# Patient Record
Sex: MALE | Race: WHITE | NOT HISPANIC OR LATINO | Employment: OTHER | URBAN - METROPOLITAN AREA
[De-identification: names, ages, dates, MRNs, and addresses within clinical notes are randomized per-mention and may not be internally consistent; named-entity substitution may affect disease eponyms.]

---

## 2023-09-08 ENCOUNTER — APPOINTMENT (OUTPATIENT)
Dept: PRIMARY CARE | Facility: CLINIC | Age: 68
End: 2023-09-08
Payer: COMMERCIAL

## 2023-11-20 ENCOUNTER — APPOINTMENT (OUTPATIENT)
Dept: AUDIOLOGY | Facility: CLINIC | Age: 68
End: 2023-11-20
Payer: COMMERCIAL

## 2023-11-20 ENCOUNTER — APPOINTMENT (OUTPATIENT)
Dept: OTOLARYNGOLOGY | Facility: CLINIC | Age: 68
End: 2023-11-20
Payer: COMMERCIAL

## 2023-11-27 ENCOUNTER — CLINICAL SUPPORT (OUTPATIENT)
Dept: AUDIOLOGY | Facility: HOSPITAL | Age: 68
End: 2023-11-27
Payer: COMMERCIAL

## 2023-11-27 DIAGNOSIS — H90.3 SENSORINEURAL HEARING LOSS (SNHL) OF BOTH EARS: Primary | ICD-10-CM

## 2023-11-27 PROCEDURE — 92550 TYMPANOMETRY & REFLEX THRESH: CPT | Performed by: AUDIOLOGIST

## 2023-11-27 PROCEDURE — 92557 COMPREHENSIVE HEARING TEST: CPT | Performed by: AUDIOLOGIST

## 2023-11-27 ASSESSMENT — PAIN - FUNCTIONAL ASSESSMENT: PAIN_FUNCTIONAL_ASSESSMENT: 0-10

## 2023-11-27 ASSESSMENT — ENCOUNTER SYMPTOMS
OCCASIONAL FEELINGS OF UNSTEADINESS: 0
LOSS OF SENSATION IN FEET: 0
DEPRESSION: 0

## 2023-11-27 ASSESSMENT — PAIN SCALES - GENERAL: PAINLEVEL_OUTOF10: 0 - NO PAIN

## 2023-11-27 NOTE — PROGRESS NOTES
AUDIOLOGY ADULT AUDIOMETRIC EVALUATION      Name:  Jim Stapleton  :  1955  Age:  68 y.o.  Date of Evaluation:  2023    History:  Reason for visit:  Mr. Stapleton is seen today for an initial audiologic evaluation due to complaints of gradual decrease in hearing bilaterally. History obtained from patient report and chart review.     Change in Hearing: yes in both ears long-term duration, unsure of onset  Difficult listening environments: Others have reported that he has been talking more loudly, television volume is loud, difficulty understanding certain pitch voices  Tinnitus: yes in the right ear intermittent, non-bothersome, described as high buzzing sensation, that goes away quickly  Otalgia: denied   Aural Pressure/Fullness: yes in the right ear greater than the left ear  occurs occasionally with chewing  Recent Ear Infections/Illness: denied   Otorrhea: denied   Dizziness: denied   History of Ear Surgeries: denied   History of Noise Exposure: Yes, 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   Left Ear: Clear ear canal, TM was visualized with identifiable cone of light.        Immittance Testing (226 Hz):   Right Ear: Type A, normal middle ear pressure and mobility.   Left Ear: Type As, normal middle ear pressure and reduced mobility.    Test technique:  Standard Audiometry via insert earphones    Reliability:   good    Pure Tone Audiometry:  Mild for 125-1000 Hz, sloping to moderate through 4000 Hz and severe through 8000 Hz for both ears.     Speech Audiometry:   Right Ear:  Speech Reception Threshold (SRT) was obtained at 45 dBHL  Word Recognition scores were  good in quiet when words were presented at 85 dBHL  Left Ear:  Speech Reception Threshold (SRT) was obtained at 45 dBHL  Word Recognition scores were good in quiet when words were presented at 85 dBHL    IMPRESSIONS:  Today's test results are hearing loss requiring audiologic follow-up.     Amplification needs:  Jim would benefit from bilateral amplification fitted to his hearing loss.     RECOMMENDATIONS:  Follow-up with Lonnie Clarke as scheduled.  Contact insurance company to inquire about where is in network for hearing aids.    Return annually for audiologic evaluation, or sooner if concerns arise.     PATIENT EDUCATION:   Discussed results and recommendations with Mr. Stapleton. Questions were addressed and the patient was encouraged to contact our department should concerns arise.      Jessica Mejia, [facility]  Licensed Audiologist    TIME: 1135 - 1200        Degree of   Hearing Sensitivity dB Range   Within Normal Limits (WNL) 0 - 20   Slight 25   Mild 26 - 40   Moderate 41 - 55   Moderately-Severe 56 - 70   Severe 71 - 90   Profound 91 +      KEY  TM Tympanic Membrane   WNL Within Normal Limits   HA Hearing Aid   SNHL Sensorineural Hearing Loss   CHL Conductive Hearing Loss   NIHL Noise-Induced Hearing Loss   ECV Ear Canal Volume

## 2023-11-29 ENCOUNTER — APPOINTMENT (OUTPATIENT)
Dept: OTOLARYNGOLOGY | Facility: HOSPITAL | Age: 68
End: 2023-11-29
Payer: COMMERCIAL

## 2024-01-29 ENCOUNTER — OFFICE VISIT (OUTPATIENT)
Dept: OTOLARYNGOLOGY | Facility: CLINIC | Age: 69
End: 2024-01-29
Payer: COMMERCIAL

## 2024-01-29 VITALS — TEMPERATURE: 97.1 F | HEIGHT: 73 IN | WEIGHT: 215.9 LBS | BODY MASS INDEX: 28.61 KG/M2

## 2024-01-29 DIAGNOSIS — H90.3 SENSORINEURAL HEARING LOSS (SNHL) OF BOTH EARS: Primary | ICD-10-CM

## 2024-01-29 PROCEDURE — 99214 OFFICE O/P EST MOD 30 MIN: CPT | Performed by: OTOLARYNGOLOGY

## 2024-01-29 PROCEDURE — 1126F AMNT PAIN NOTED NONE PRSNT: CPT | Performed by: OTOLARYNGOLOGY

## 2024-01-29 PROCEDURE — 1160F RVW MEDS BY RX/DR IN RCRD: CPT | Performed by: OTOLARYNGOLOGY

## 2024-01-29 PROCEDURE — 1159F MED LIST DOCD IN RCRD: CPT | Performed by: OTOLARYNGOLOGY

## 2024-01-29 PROCEDURE — 1036F TOBACCO NON-USER: CPT | Performed by: OTOLARYNGOLOGY

## 2024-01-29 RX ORDER — ALLOPURINOL 300 MG/1
300 TABLET ORAL
COMMUNITY
Start: 2013-10-03

## 2024-01-29 RX ORDER — CLOPIDOGREL BISULFATE 75 MG/1
75 TABLET ORAL
COMMUNITY
Start: 2023-02-17

## 2024-01-29 RX ORDER — ISOSORBIDE MONONITRATE 60 MG/1
60 TABLET, EXTENDED RELEASE ORAL
COMMUNITY
Start: 2022-10-21

## 2024-01-29 RX ORDER — METFORMIN HYDROCHLORIDE 500 MG/1
TABLET ORAL
COMMUNITY
Start: 2020-11-20

## 2024-01-29 RX ORDER — NITROGLYCERIN 0.4 MG/1
0.4 TABLET SUBLINGUAL
COMMUNITY
Start: 2024-01-23

## 2024-01-29 RX ORDER — ROSUVASTATIN CALCIUM 20 MG/1
20 TABLET, COATED ORAL
COMMUNITY
Start: 2023-02-17

## 2024-01-29 RX ORDER — LANCETS 28 GAUGE
EACH MISCELLANEOUS
COMMUNITY
Start: 2023-02-17

## 2024-01-29 RX ORDER — METOPROLOL TARTRATE 25 MG/1
25 TABLET, FILM COATED ORAL 2 TIMES DAILY
COMMUNITY
Start: 2023-02-17

## 2024-01-29 RX ORDER — EVOLOCUMAB 140 MG/ML
INJECTION, SOLUTION SUBCUTANEOUS
COMMUNITY
Start: 2023-12-26

## 2024-01-29 RX ORDER — BLOOD-GLUCOSE METER
KIT MISCELLANEOUS
COMMUNITY
Start: 2016-10-04

## 2024-01-29 RX ORDER — GLIMEPIRIDE 2 MG/1
4 TABLET ORAL
COMMUNITY
Start: 2016-10-04

## 2024-01-29 RX ORDER — FAMOTIDINE 20 MG/1
TABLET, FILM COATED ORAL
COMMUNITY

## 2024-01-29 RX ORDER — FUROSEMIDE 40 MG/1
1 TABLET ORAL DAILY
COMMUNITY
Start: 2022-10-21

## 2024-01-29 RX ORDER — SACUBITRIL AND VALSARTAN 49; 51 MG/1; MG/1
1 TABLET, FILM COATED ORAL 2 TIMES DAILY
COMMUNITY
Start: 2023-11-03

## 2024-01-29 RX ORDER — EMPAGLIFLOZIN 10 MG/1
TABLET, FILM COATED ORAL
COMMUNITY
Start: 2022-08-25

## 2024-01-29 ASSESSMENT — PATIENT HEALTH QUESTIONNAIRE - PHQ9
SUM OF ALL RESPONSES TO PHQ9 QUESTIONS 1 AND 2: 0
2. FEELING DOWN, DEPRESSED OR HOPELESS: NOT AT ALL
1. LITTLE INTEREST OR PLEASURE IN DOING THINGS: NOT AT ALL

## 2024-01-29 NOTE — PROGRESS NOTES
Reason for Consult:  Hearing Loss     Subjective   History Of Present Illness:  Jim Stapleton is a 68 y.o. male who noticed a decline in his hearing over the last few years in both ears. He denies familial hearing loss, chemotherapy, radiation therapy, or long term IV antibiotics.     He saw Christina Sen who did a hearing test that showed a bilateral symmetric sensory hearing loss, mild downsloping to moderately severe. For that reason, he was referred to me for evaluation of treatment. He denies dizziness or tinnitus     Past Medical History:  He has no past medical history on file.    Surgical History:  He has no past surgical history on file.     Social History:  He reports that he has never smoked. He has never used smokeless tobacco. No history on file for alcohol use and drug use.    Family History:  family history is not on file.     Medications:  Current Outpatient Medications   Medication Instructions    allopurinol (ZYLOPRIM) 300 mg, oral, Daily RT    clopidogrel (PLAVIX) 75 mg, oral, Daily RT    Entresto 49-51 mg tablet 1 tablet, oral, 2 times daily    famotidine (Pepcid) 20 mg tablet 1 tablet at bedtime as needed Orally Once a day for 90 days    FreeStyle Lancets 28 gauge     FreeStyle Lite Strips strip     furosemide (Lasix) 40 mg tablet 1 tablet, oral, Daily    glimepiride (AMARYL) 4 mg, oral    isosorbide mononitrate ER (IMDUR) 60 mg, oral, Daily RT    Jardiance 10 mg Daily RT    metFORMIN (Glucophage) 500 mg tablet twice daily with meals.    metoprolol tartrate (LOPRESSOR) 25 mg, oral, 2 times daily    nitroglycerin (NITROSTAT) 0.4 mg, sublingual    Repatha SureClick 140 mg/mL injection ADMINISTER 1 ML UNDER THE SKIN EVERY 2 WEEKS    rosuvastatin (CRESTOR) 20 mg, oral, Daily RT      Allergies:  Adalimumab, Aspirin, Iodinated contrast media, and Indomethacin    Review of Systems:   A comprehensive 10-point review of systems was obtained including constitutional, neurological, HEENT, pulmonary,  "cardiovascular, genito-urinary, and other pertinent systems and was negative except as noted in the HPI.     Objective   Physical Exam:  Last Recorded Vitals: Temperature 36.2 °C (97.1 °F), height 1.854 m (6' 1\"), weight 97.9 kg (215 lb 14.4 oz).    On physical exam, the patient is a well-nourished, well-developed patient, in no acute distress, able to communicate without assistance in English language. Head and face is atraumatic and normocephalic. Salivary glands are intact. Facial strength is symmetrical bilaterally.       On ear examination:  Right ear: The patient has an open and patent ear canal. The tympanic membrane is intact.  AC>BC  Left ear: The patient has an open and patent ear canal. The tympanic membrane is intact.  AC>BC  The Sow is midline    On vestibular exam, the patient has no spontaneous nystagmus, no headshake nystagmus, no head-thrust nystagmus, and no nystagmus on hyperventilation or Valsalva maneuvers. Vacherie-Hallpike maneuver is negative bilaterally.       On neuro exam, the patient is alert and oriented x3, cranial nerves are grossly intact, cerebellar exam is normal.      The rest of the exam, including anterior rhinoscopy, oropharyngeal exam, neck exam, and cardiovascular exam, were normal including no palpable lymphadenopathies, thyroid in the midline position, normal pulses, and normal chest excursion.       Reviewed Results:  Audiology Testing:  I personally reviewed the audiogram from 11/2023 that showed a mild sensory hearing loss downsloping to moderately severe levels bilaterally. It is symmetric. He had 90% discrimination on the right and 88% on the left.         Procedure:  None    Assessment/Plan     In summary, Jim Stapleton is a 68 y.o. male with bilateral symmetric mild downsloping to moderately severe sensory hearing loss with good speech understanding.     I recommended a hearing evaluation. I provided him with a prescription.    I plan to see him back as needed.        Scribe " Attestation  By signing my name below, I, Tarik Owen   attest that this documentation has been prepared under the direction and in the presence of Lonnie Clarke MD.   ____________________________________________________  Lonnie Mayberry MD  Professor and Chief   Otology/Neurotology/Lateral Skull-Base Surgery   [facility]

## 2024-01-29 NOTE — LETTER
January 29, 2024     Tonia Clark MD  [address]  [address]  [place] [address]    Patient: Jim Stapleton   YOB: 1955   Date of Visit: 1/29/2024       Dear Dr. Tonia Clark MD:    Thank you for referring Jim Stapleton to me for evaluation. Below are my notes for this consultation.  If you have questions, please do not hesitate to call me. I look forward to following your patient along with you.       Sincerely,     Lonnie Clarke MD      CC: No Recipients  ______________________________________________________________________________________            Reason for Consult:  Hearing Loss     Subjective  History Of Present Illness:  Jim Stapleton is a 68 y.o. male who noticed a decline in his hearing over the last few years in both ears. He denies familial hearing loss, chemotherapy, radiation therapy, or long term IV antibiotics.     He saw Christina Sen who did a hearing test that showed a bilateral symmetric sensory hearing loss, mild downsloping to moderately severe. For that reason, he was referred to me for evaluation of treatment. He denies dizziness or tinnitus     Past Medical History:  He has no past medical history on file.    Surgical History:  He has no past surgical history on file.     Social History:  He reports that he has never smoked. He has never used smokeless tobacco. No history on file for alcohol use and drug use.    Family History:  family history is not on file.     Medications:  Current Outpatient Medications   Medication Instructions   • allopurinol (ZYLOPRIM) 300 mg, oral, Daily RT   • clopidogrel (PLAVIX) 75 mg, oral, Daily RT   • Entresto 49-51 mg tablet 1 tablet, oral, 2 times daily   • famotidine (Pepcid) 20 mg tablet 1 tablet at bedtime as needed Orally Once a day for 90 days   • FreeStyle Lancets 28 gauge    • FreeStyle Lite Strips strip    • furosemide (Lasix) 40 mg tablet 1 tablet, oral, Daily   • glimepiride (AMARYL) 4 mg, oral   • isosorbide  "mononitrate ER (IMDUR) 60 mg, oral, Daily RT   • Jardiance 10 mg Daily RT   • metFORMIN (Glucophage) 500 mg tablet twice daily with meals.   • metoprolol tartrate (LOPRESSOR) 25 mg, oral, 2 times daily   • nitroglycerin (NITROSTAT) 0.4 mg, sublingual   • Repatha SureClick 140 mg/mL injection ADMINISTER 1 ML UNDER THE SKIN EVERY 2 WEEKS   • rosuvastatin (CRESTOR) 20 mg, oral, Daily RT      Allergies:  Adalimumab, Aspirin, Iodinated contrast media, and Indomethacin    Review of Systems:   A comprehensive 10-point review of systems was obtained including constitutional, neurological, HEENT, pulmonary, cardiovascular, genito-urinary, and other pertinent systems and was negative except as noted in the HPI.     Objective  Physical Exam:  Last Recorded Vitals: Temperature 36.2 °C (97.1 °F), height 1.854 m (6' 1\"), weight 97.9 kg (215 lb 14.4 oz).    On physical exam, the patient is a well-nourished, well-developed patient, in no acute distress, able to communicate without assistance in English language. Head and face is atraumatic and normocephalic. Salivary glands are intact. Facial strength is symmetrical bilaterally.       On ear examination:  Right ear: The patient has an open and patent ear canal. The tympanic membrane is intact.  AC>BC  Left ear: The patient has an open and patent ear canal. The tympanic membrane is intact.  AC>BC  The Sow is midline    On vestibular exam, the patient has no spontaneous nystagmus, no headshake nystagmus, no head-thrust nystagmus, and no nystagmus on hyperventilation or Valsalva maneuvers. Radha-Hallpike maneuver is negative bilaterally.       On neuro exam, the patient is alert and oriented x3, cranial nerves are grossly intact, cerebellar exam is normal.      The rest of the exam, including anterior rhinoscopy, oropharyngeal exam, neck exam, and cardiovascular exam, were normal including no palpable lymphadenopathies, thyroid in the midline position, normal pulses, and normal chest " excursion.       Reviewed Results:  Audiology Testing:  I personally reviewed the audiogram from 11/2023 that showed a mild sensory hearing loss downsloping to moderately severe levels bilaterally. It is symmetric. He had 90% discrimination on the right and 88% on the left.         Procedure:  None    Assessment/Plan    In summary, Jim Stapleton is a 68 y.o. male with bilateral symmetric mild downsloping to moderately severe sensory hearing loss with good speech understanding.     I recommended a hearing evaluation. I provided him with a prescription.    I plan to see him back as needed.        Scribe Attestation  By signing my name below, I, Rosey Case , Scribe   attest that this documentation has been prepared under the direction and in the presence of Lonnie Clarke MD.   ____________________________________________________  Lonnie Mayberry MD  Professor and Chief   Otology/Neurotology/Lateral Skull-Base Surgery   [facility]

## 2024-02-22 ENCOUNTER — APPOINTMENT (OUTPATIENT)
Dept: AUDIOLOGY | Facility: CLINIC | Age: 69
End: 2024-02-22
Payer: COMMERCIAL

## 2024-03-11 NOTE — PROGRESS NOTES
HEARING AID CONSULTATION    Name: Jim Stapleton  Age: 68 y.o.  MRN: [id number]  Date of Appointment:  3/12/2024    History:  Jim Stapleton, 68 y.o. years old, was seen for a hearing aid consultation. He is accompanied by his partner, Phillip,  to today's appointment.     He has a long-standing history of hearing loss in both ears. He has previously indicated difficulties understanding others in person and on the television. He has intermittent tinnitus in the right ear. 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    Previous hearing evaluation on 11/27/2023 revealed a mild through 1000 Hz sloping to severe sensorineural hearing loss in both ears. Word recognition abilities were measured to be excellent in the right ear and good in the left ear.     Hearing Aid Consultation  The patient reported that he has a hearing aid benefit through his Medicaid insurance. He was informed that [facility] is not an in-network hearing aid provider for Medicaid. Any hearing aid purchase would be an out-of-pocket expense for this patient. He stated that he would like to obtain hearing aids through his benefit. He was given the contact information for [facility] and encouraged to contact his  for other in-network providers.    Recommendations  1) Schedule an appointment at an in-network hearing aid provider  2) Re-test hearing annually      Time: [id number]    KURTIS Wilhelm, CCC-A  Licensed Audiologist

## 2024-03-12 ENCOUNTER — CLINICAL SUPPORT (OUTPATIENT)
Dept: AUDIOLOGY | Facility: CLINIC | Age: 69
End: 2024-03-12
Payer: COMMERCIAL

## 2024-03-12 DIAGNOSIS — H90.3 SENSORINEURAL HEARING LOSS (SNHL) OF BOTH EARS: Primary | ICD-10-CM

## 2024-03-12 ASSESSMENT — PAIN SCALES - GENERAL: PAINLEVEL_OUTOF10: 0 - NO PAIN
